# Patient Record
(demographics unavailable — no encounter records)

---

## 2024-10-09 NOTE — PROCEDURE
[FreeTextEntry1] : I performed limited ultrasound of the right lower extremity.  Study is negative for DVT.  Treated segment of the great saphenous vein is occluded.

## 2024-10-09 NOTE — PHYSICAL EXAM
[Normal Breath Sounds] : Normal breath sounds [Normal Heart Sounds] : normal heart sounds [2+] : left 2+ [Varicose Veins Of Lower Extremities] : bilaterally [Ankle Swelling On The Right] : mild [No Rash or Lesion] : No rash or lesion [Ankle Swelling (On Exam)] : not present [] : not present

## 2024-10-09 NOTE — HISTORY OF PRESENT ILLNESS
[FreeTextEntry1] : Patient is status post endovenous ablation of the right great saphenous vein 5 days ago.

## 2024-11-06 NOTE — HISTORY OF PRESENT ILLNESS
[FreeTextEntry1] : Patient is status post endovenous therapy varicosity right leg.  Clinically she has improved significantly.  Recently she had left total hip replacement.

## 2024-11-06 NOTE — PHYSICAL EXAM
[JVD] : no jugular venous distention  [Normal Breath Sounds] : Normal breath sounds [2+] : left 2+ [Ankle Swelling (On Exam)] : not present [Varicose Veins Of Lower Extremities] : bilaterally [Ankle Swelling On The Right] : mild [] : not present [Abdomen Masses] : No abdominal masses [Abdomen Tenderness] : ~T ~M No abdominal tenderness [Alert] : alert [Calm] : calm [de-identified] : Moderately overweight no acute distress. [de-identified] : Spider veins both lower extremities

## 2025-03-07 NOTE — PROCEDURE
[FreeTextEntry1] : Radiofrequency ablation of the left anterior saphenous vein [FreeTextEntry2] : Varicose veins left leg [FreeTextEntry3] : With the patient on the OR table with the vein mapping was done.  The entry site was chosen here in the middle thigh area.  Field was prepped and draped in usual sterile fashion.  1% lidocaine was injected at the entry site then using ultrasound guidance and standard Seldinger technique vein was cannulated and guidewire inserted.  7 English introducer was placed over the guidewire.  Radiofrequency probe was advanced through the introducer positioning tip about 2 cm proximal to the junction.  Patient was repositioned in the Trendelenburg.  Tumescent anesthetic was injected along the vein under ultrasound guidance.  Ablation was performed without complication.  Patient tolerated procedure well.

## 2025-03-11 NOTE — PHYSICAL EXAM
[JVD] : no jugular venous distention  [Normal Breath Sounds] : Normal breath sounds [Normal Heart Sounds] : normal heart sounds [2+] : left 2+ [Ankle Swelling (On Exam)] : not present [Varicose Veins Of Lower Extremities] : present [Varicose Veins Of The Left Leg] : of the left leg [Ankle Swelling On The Right] : mild [] : not present [Abdomen Masses] : No abdominal masses [Abdomen Tenderness] : ~T ~M No abdominal tenderness [No HSM] : no hepatosplenomegaly [No Rash or Lesion] : No rash or lesion [Alert] : alert [Calm] : calm [FreeTextEntry1] : Varicosity of the left lower leg extremity has improved significantly.

## 2025-03-11 NOTE — HISTORY OF PRESENT ILLNESS
[FreeTextEntry1] : Patient is status post endovenous ablation of the left anterior accessory saphenous vein.  Ultrasound was negative for DVT.  Treated vein is occluded.

## 2025-03-11 NOTE — ASSESSMENT
[FreeTextEntry1] : I recommended to wear compression stockings.  I have reviewed the patient's study personally.  Since patient is going away follow-up visit will be done in September with a left leg sonogram.